# Patient Record
Sex: FEMALE
[De-identification: names, ages, dates, MRNs, and addresses within clinical notes are randomized per-mention and may not be internally consistent; named-entity substitution may affect disease eponyms.]

---

## 2018-01-18 ENCOUNTER — HOSPITAL ENCOUNTER (EMERGENCY)
Dept: HOSPITAL 88 - ER | Age: 33
Discharge: HOME | End: 2018-01-18
Payer: SELF-PAY

## 2018-01-18 VITALS — DIASTOLIC BLOOD PRESSURE: 87 MMHG | SYSTOLIC BLOOD PRESSURE: 148 MMHG

## 2018-01-18 VITALS — HEIGHT: 59 IN | BODY MASS INDEX: 41.93 KG/M2 | WEIGHT: 208 LBS

## 2018-01-18 DIAGNOSIS — J00: ICD-10-CM

## 2018-01-18 DIAGNOSIS — R50.9: Primary | ICD-10-CM

## 2018-01-18 DIAGNOSIS — I10: ICD-10-CM

## 2018-01-18 DIAGNOSIS — J02.0: ICD-10-CM

## 2018-01-18 LAB
FLUAV + FLUBV AG SPEC IF: NEGATIVE
S PYO AG THROAT QL: POSITIVE

## 2018-01-18 PROCEDURE — 83518 IMMUNOASSAY DIPSTICK: CPT

## 2018-01-18 PROCEDURE — 71020: CPT

## 2018-01-18 PROCEDURE — 99283 EMERGENCY DEPT VISIT LOW MDM: CPT

## 2018-01-18 PROCEDURE — 87400 INFLUENZA A/B EACH AG IA: CPT

## 2018-01-18 NOTE — DIAGNOSTIC IMAGING REPORT
EXAMINATION:  CHEST 2 VIEWS    



INDICATION:           Sore throat, fever, bodyaches 



COMPARISON:  None

     

FINDINGS:

TUBES and LINES:  None.



LUNGS:  Lungs are well inflated.  Lungs are clear.   There is no evidence of

pneumonia or pulmonary edema.



PLEURA:  No pleural effusion or pneumothorax.



HEART AND MEDIASTINUM:  The cardiomediastinal silhouette is unremarkable.    



BONES AND SOFT TISSUES:  No acute osseous lesion.  Soft tissues are

unremarkable.



UPPER ABDOMEN: No free air under the diaphragm.    



IMPRESSION: 

No acute thoracic abnormality.





Signed by: Dr. Timoteo Rondon M.D. on 1/18/2018 10:34 PM